# Patient Record
Sex: FEMALE | Race: WHITE | NOT HISPANIC OR LATINO | URBAN - METROPOLITAN AREA
[De-identification: names, ages, dates, MRNs, and addresses within clinical notes are randomized per-mention and may not be internally consistent; named-entity substitution may affect disease eponyms.]

---

## 2024-08-13 ENCOUNTER — EMERGENCY (EMERGENCY)
Facility: HOSPITAL | Age: 59
LOS: 1 days | Discharge: HOME | End: 2024-08-13
Admitting: EMERGENCY MEDICINE
Payer: COMMERCIAL

## 2024-08-13 VITALS
WEIGHT: 143.3 LBS | RESPIRATION RATE: 16 BRPM | SYSTOLIC BLOOD PRESSURE: 129 MMHG | OXYGEN SATURATION: 98 % | HEART RATE: 64 BPM | TEMPERATURE: 99 F | DIASTOLIC BLOOD PRESSURE: 83 MMHG

## 2024-08-13 PROCEDURE — 99284 EMERGENCY DEPT VISIT MOD MDM: CPT

## 2024-08-13 PROCEDURE — 73610 X-RAY EXAM OF ANKLE: CPT

## 2024-08-13 PROCEDURE — 90715 TDAP VACCINE 7 YRS/> IM: CPT

## 2024-08-13 PROCEDURE — 99283 EMERGENCY DEPT VISIT LOW MDM: CPT | Mod: 25

## 2024-08-13 PROCEDURE — 73610 X-RAY EXAM OF ANKLE: CPT | Mod: 26,LT

## 2024-08-13 PROCEDURE — 90471 IMMUNIZATION ADMIN: CPT

## 2024-08-13 RX ORDER — CLOSTRIDIUM TETANI TOXOID ANTIGEN (FORMALDEHYDE INACTIVATED), CORYNEBACTERIUM DIPHTHERIAE TOXOID ANTIGEN (FORMALDEHYDE INACTIVATED), BORDETELLA PERTUSSIS TOXOID ANTIGEN (GLUTARALDEHYDE INACTIVATED), BORDETELLA PERTUSSIS FILAMENTOUS HEMAGGLUTININ ANTIGEN (FORMALDEHYDE INACTIVATED), BORDETELLA PERTUSSIS PERTACTIN ANTIGEN, AND BORDETELLA PERTUSSIS FIMBRIAE 2/3 ANTIGEN 5; 2; 2.5; 5; 3; 5 [LF]/.5ML; [LF]/.5ML; UG/.5ML; UG/.5ML; UG/.5ML; UG/.5ML
0.5 INJECTION, SUSPENSION INTRAMUSCULAR ONCE
Refills: 0 | Status: COMPLETED | OUTPATIENT
Start: 2024-08-13 | End: 2024-08-13

## 2024-08-13 RX ORDER — BACITRACIN 500 UNIT/G
1 OINTMENT (GRAM) TOPICAL ONCE
Refills: 0 | Status: COMPLETED | OUTPATIENT
Start: 2024-08-13 | End: 2024-08-13

## 2024-08-13 RX ORDER — ACETAMINOPHEN 500 MG
1000 TABLET ORAL ONCE
Refills: 0 | Status: COMPLETED | OUTPATIENT
Start: 2024-08-13 | End: 2024-08-13

## 2024-08-13 RX ADMIN — CLOSTRIDIUM TETANI TOXOID ANTIGEN (FORMALDEHYDE INACTIVATED), CORYNEBACTERIUM DIPHTHERIAE TOXOID ANTIGEN (FORMALDEHYDE INACTIVATED), BORDETELLA PERTUSSIS TOXOID ANTIGEN (GLUTARALDEHYDE INACTIVATED), BORDETELLA PERTUSSIS FILAMENTOUS HEMAGGLUTININ ANTIGEN (FORMALDEHYDE INACTIVATED), BORDETELLA PERTUSSIS PERTACTIN ANTIGEN, AND BORDETELLA PERTUSSIS FIMBRIAE 2/3 ANTIGEN 0.5 MILLILITER(S): 5; 2; 2.5; 5; 3; 5 INJECTION, SUSPENSION INTRAMUSCULAR at 18:04

## 2024-08-13 RX ADMIN — Medication 1000 MILLIGRAM(S): at 18:01

## 2024-08-13 RX ADMIN — Medication 1 APPLICATION(S): at 18:01

## 2024-08-13 NOTE — ED PROVIDER NOTE - OBJECTIVE STATEMENT
The pt is a 58 y/o F, who presents to ED c/o L ankle pain s/p inverting it - also sustained abrasions to R elbow and R knee, pt states "those are fine, they don't hurt at all". Last Tdap? Pt states pain is 5/10 to L ankle, has not taken any pain meds, able to walk. Denies head trauma, loc, numbness or tingling to toes, decreased ROM, inability to weight bare.

## 2024-08-13 NOTE — ED ADULT TRIAGE NOTE - CHIEF COMPLAINT QUOTE
pt complaining of right elbow and right knee pain s/p trip and fall in [place] no head trauma LOC blood thinner use. abrasions noted. ambulates with steady gait

## 2024-08-13 NOTE — ED ADULT NURSE NOTE - CHIEF COMPLAINT QUOTE
pt complaining of right elbow and right knee pain s/p trip and fall in [place] no head trauma LOC blood thinner use. abrasions noted. ambulates with steady gait

## 2024-08-13 NOTE — ED ADULT TRIAGE NOTE - AS TEMP SITE
oral Abdomen soft, non-tender and non-distended, no rebound, no guarding and no masses. no hepatosplenomegaly. g tube site with no erythema/drainage

## 2024-08-13 NOTE — ED PROVIDER NOTE - NSFOLLOWUPINSTRUCTIONS_ED_ALL_ED_FT
Ankle Sprain  Illustration of an ankle sprain caused by a foot turning outward and a foot turning inward.  An ankle sprain is a stretch or tear in a ligament in your ankle. Ligaments are tissues that connect bones to each other.  An ankle sprain can happen when:  The ankle rolls outward. This is called an inversion sprain.  The ankle rolls inward. This is called an eversion sprain.  What are the causes?  An ankle sprain is caused by rolling or twisting your ankle.  What increases the risk?  You are more likely to get an ankle sprain if you play sports.  What are the signs or symptoms?  Comparison of a normal ankle and an ankle that is swollen and bruised.  Pain in your ankle.  Swelling.  Bruising. Bruises may form right after you sprain your ankle or 1–2 days later.  Trouble standing or walking.  How is this treated?  An ankle sprain may be treated with:  A brace or splint. This is used to keep the ankle from moving until it heals.  An elastic bandage (dressing). This is used to support the ankle.  Crutches.  Pain medicine.  Surgery. This may be needed if the sprain is very bad.  Physical therapy. This can help you move your ankle better.  Follow these instructions at home:  If you have a brace or a splint that can be taken off:  Wear the brace or splint as told by your doctor. Take it off only as told by your doctor.  Check the skin around the brace or splint every day. Tell your doctor if you see problems.  Loosen the brace or splint if your toes:  Tingle.  Become numb.  Turn cold and blue.  Keep the brace or splint clean and dry.  If the brace or splint is not waterproof:  Do not let it get wet.  Cover it with a watertight covering when you take a bath or a shower.  If you have an elastic dressing:  Take it off to shower or bathe.  Adjust it if it feels too tight.  Loosen the dressing if your foot:  Tingles.  Becomes numb.  Turns cold and blue.  Managing pain, stiffness, and swelling  If told, put ice on the affected area.  If you have a removable brace or splint, take it off as told by your doctor.  Put ice in a plastic bag.  Place a towel between your skin and the bag.  Leave the ice on for 20 minutes, 2–3 times a day.  If your skin turns bright red, take off the ice right away to prevent skin damage. The risk of damage is higher if you cannot feel pain, heat, or cold.  Move your toes often.  Raise your ankle above the level of your heart while you are sitting or lying down.  General instructions  Take over-the-counter and prescription medicines only as told by your doctor.  Do not smoke or use any products that contain nicotine or tobacco. If you need help quitting, ask your doctor.  Rest your ankle.  Use crutches to support your body weight. Do not use your injured leg to support your body weight until your doctor says that you can.  Ask your doctor when it is safe to drive if you have a brace or splint on your ankle.  Contact a doctor if:  Your bruises or swelling get worse all of a sudden.  Your pain does not get better after you take medicine.  Get help right away if:  Your foot or toes are numb or blue.  You have very bad pain that gets worse.  Abrasion  An abrasion is a cut or a scrape on your skin. An abrasion affects only the top layers of your skin.  You must take care of your wound so germs do not get in it and cause infection.  What are the causes?  This condition is caused by rubbing your skin on something or falling on a rough surface, such as the ground. When your skin rubs on something, some layers of skin may rub off.  What are the signs or symptoms?  A cut or a scrape.  Bleeding.  A red or pink spot.  A bruise under your wound.  How is this treated?  This condition may be treated by:  Cleaning your wound.  Putting an antibiotic on your wound.  Putting a jelly on your wound to stop moisture from entering the wound.  Putting a bandage on your wound.  Getting a tetanus shot.  Follow these instructions at home:  Medicines  Take or use over-the-counter and prescription medicines only as told by your doctor.  If you were prescribed antibiotics, use them as told by your doctor. Do not stop using them even if you start to feel better.  Caring for your wound  Clean your wound 1–2 times a day or as told by your doctor.  Wash your hands for at least 20 seconds with soap and water. Do this before and after you clean your wound.  If you cannot use soap and water, use hand .  Wash your wound with mild soap and water. You may also use a wound cleanser or saltwater solution, called saline.  Do not use hydrogen peroxide or alcohol. These can slow healing.  Rinse off the soap.  Pat your wound with a clean towel to dry it. Do not rub your wound.  Put an antibiotic ointment on your wound as told by your doctor. You may also be told to put on a jelly that stops moisture from entering the wound.  Cover your wound with a bandage as told by your doctor. Small or very minor wounds may not need a bandage.  Keep your bandage clean and dry. Take it off and change it as told by your doctor.  You may have to change your bandage one or more times a day, or as told by your doctor.  Watch for signs of infection  Check your wound every day for signs of infection. Check for:  A red streak that goes away from your wound.  Other redness.  Swelling or more pain.  Warmth.  Blood, fluid, pus, or a bad smell.  Treat pain and swelling  Bag of ice on a towel on the skin.  If told, put ice on the injured area.  Put ice in a plastic bag.  Place a towel between your skin and the bag.  Leave the ice on for 20 minutes, 2–3 times a day.  If your skin turns bright red, take off the ice right away to prevent skin damage. The risk of damage is higher if you cannot feel pain, heat, or cold.  If you can, raise the injured area above the level of your heart while you are sitting or lying down.  General instructions  Do not take baths, swim, or use a hot tub. Ask your doctor about taking showers or sponge baths.  Do not scratch or pick at scabs that may occur over the wound as it heals.  Contact a doctor if:  You had a tetanus shot, and you have any of these in the area where the needle went in:  Swelling.  Very bad pain.  Redness.  Bleeding.  You have a lot of pain, and medicine does not help.  You have a fever.  You have any signs of infection in your wound.  Get help right away if:  You have a red streak going away from your wound.

## 2024-08-13 NOTE — ED PROVIDER NOTE - MUSCULOSKELETAL, MLM
Spine appears normal, range of motion is not limited, no muscle or joint tenderness; R elbow: a 2 mm abrasion to posterior aspect, FROM, no bony tend, no pain w/supination or pronation, R knee: a  1 mm non bleeding, very superficial abrasion to anterior aspect, no bony tend, FROM; L  ankle: + swelling below lateral malleolus, + tend below lateral malleolus, no direct bony tend over medial / lateral malleolus, achilles tendon intact, FROM, no tend over metatarsals, pedal pulse 2+

## 2024-08-13 NOTE — ED ADULT NURSE NOTE - OBJECTIVE STATEMENT
59y F presents to ED c/o mechanical fall. Reports trip and fall in [place]. Abrasions noted to R knee, R elbow. Endorses L ankle pain s/p "twisting" while falling. Denies head strike, LOC, numbness/tingling. Pt AAOx4, speaking in full and clear sentences, NAD at this time.

## 2024-08-13 NOTE — ED PROVIDER NOTE - PATIENT PORTAL LINK FT
You can access the FollowMyHealth Patient Portal offered by [facility] by registering at the following website: [email or web address]. By joining [employer]’s FollowMyHealth portal, you will also be able to view your health information using other applications (apps) compatible with our system.

## 2024-08-15 DIAGNOSIS — S80.211A ABRASION, RIGHT KNEE, INITIAL ENCOUNTER: ICD-10-CM

## 2024-08-15 DIAGNOSIS — Z88.6 ALLERGY STATUS TO ANALGESIC AGENT: ICD-10-CM

## 2024-08-15 DIAGNOSIS — S50.311A ABRASION OF RIGHT ELBOW, INITIAL ENCOUNTER: ICD-10-CM

## 2024-08-15 DIAGNOSIS — M25.572 PAIN IN LEFT ANKLE AND JOINTS OF LEFT FOOT: ICD-10-CM

## 2024-08-15 DIAGNOSIS — S93.402A SPRAIN OF UNSPECIFIED LIGAMENT OF LEFT ANKLE, INITIAL ENCOUNTER: ICD-10-CM
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
## 2024-08-15 DIAGNOSIS — Z23 ENCOUNTER FOR IMMUNIZATION: ICD-10-CM

## 2025-05-13 NOTE — ED PROVIDER NOTE - CLINICAL SUMMARY MEDICAL DECISION MAKING FREE TEXT BOX
ms hicks
pt s/p mechanical fall, inverted L ankle - c/o pain, sustained abrasion to r elbow/knee - no tend on exam, pt c/o l ankle pain - swelling on exam but ambulatory w/o dif, suspect sprain - xrays neg, placed in ankle brace for comfort/support, to RICE, prn tyl, tdap updated, pt understands and agrees w/plan, strict return precautions given